# Patient Record
Sex: FEMALE | Race: WHITE
[De-identification: names, ages, dates, MRNs, and addresses within clinical notes are randomized per-mention and may not be internally consistent; named-entity substitution may affect disease eponyms.]

---

## 2017-03-29 ENCOUNTER — HOSPITAL ENCOUNTER (OUTPATIENT)
Dept: HOSPITAL 47 - RADXRMAIN | Age: 57
End: 2017-03-29
Payer: COMMERCIAL

## 2017-03-29 DIAGNOSIS — R10.2: Primary | ICD-10-CM

## 2017-03-29 PROCEDURE — 73502 X-RAY EXAM HIP UNI 2-3 VIEWS: CPT

## 2017-03-29 PROCEDURE — 72170 X-RAY EXAM OF PELVIS: CPT

## 2017-03-29 NOTE — XR
EXAMINATION TYPE: XR pelvis AP view

 

DATE OF EXAM: 3/29/2017 10:52 AM

 

CLINICAL HISTORY: pain

 

TECHNIQUE: Single view the pelvis is submitted.

 

FINDINGS: No  evidence for fracture, dislocation or bony lesion.  Joint spaces are well-preserved.  S
I joints appear symmetric.

 

IMPRESSION: 

 

1. No acute fracture or dislocation seen.

 

ICD 10 NO FRACTURE, INITIAL EVALUATION

## 2017-03-29 NOTE — XR
EXAMINATION TYPE: XR Hip Complete LT

 

DATE OF EXAM: 3/29/2017 10:52 AM

 

CLINICAL HISTORY: pain

 

TECHNIQUE:  AP and frogleg views of the left hip are obtained.

 

COMPARISON: None.

 

FINDINGS:  There is no acute fracture/dislocation evident.  The joint space  appears within normal li
mits.  The overlying soft tissue appears unremarkable.

 

IMPRESSION:  

1.  There is no acute fracture or dislocation.

 

ICD 10 NO FRACTURE, INITIAL EVALUATION

## 2017-04-27 ENCOUNTER — HOSPITAL ENCOUNTER (OUTPATIENT)
Dept: HOSPITAL 47 - RADXRMAIN | Age: 57
Discharge: HOME | End: 2017-04-27
Payer: COMMERCIAL

## 2017-04-27 ENCOUNTER — HOSPITAL ENCOUNTER (OUTPATIENT)
Dept: HOSPITAL 47 - RADUSWWP | Age: 57
Discharge: HOME | End: 2017-04-27
Payer: COMMERCIAL

## 2017-04-27 DIAGNOSIS — M79.669: Primary | ICD-10-CM

## 2017-04-27 DIAGNOSIS — R93.7: Primary | ICD-10-CM

## 2017-04-27 DIAGNOSIS — M79.669: ICD-10-CM

## 2017-04-27 NOTE — US
EXAMINATION TYPE: US venous doppler duplex LE LT

 

DATE OF EXAM: 4/27/2017 2:32 PM

 

COMPARISON: US

 

CLINICAL HISTORY: 56-year-old female M79.669 Pain in unspecified lower leg. Intermittent left thigh p
ain x 3 months

 

SIDE PERFORMED: Left  

 

TECHNIQUE:  The lower extremity deep venous system is examined utilizing real time linear array sonog
pascual with graded compression, doppler sonography and color-flow sonography.

 

FINDINGS:

 

VESSELS IMAGED:

External Iliac Vein (EIV)

Common Femoral Vein

Deep Femoral Vein

Greater Saphenous Vein *

Femoral Vein

Popliteal Vein

Small Saphenous Vein *

Proximal Calf Veins

(* superficial vessels)

 

 

 

Left Leg:  Appears negative for DVT

 

 

 

IMPRESSION:

No evidence for DVT within the left lower extremity imaged from the groin to the upper calf.

## 2017-04-27 NOTE — XR
EXAMINATION TYPE: XR femur LT

 

DATE OF EXAM: 4/27/2017 10:14 AM

 

COMPARISON: NONE

 

HISTORY: Pain

 

TECHNIQUE: 4 views

 

FINDINGS: There is slight lateral cortical thickening along the proximal diaphysis of the left femur.
 Remaining portion of the osseous structures are intact. Mild arthropathy of the hip joints. No acute
 fracture or dislocation.

 

IMPRESSION: 

1. Mild cortical thinning proximal diaphysis of the femur. Recommend correlation with bone scan if th
is is the area of clinical abnormality.

## 2017-05-05 ENCOUNTER — HOSPITAL ENCOUNTER (OUTPATIENT)
Dept: HOSPITAL 47 - RADNMMAIN | Age: 57
End: 2017-05-05
Payer: COMMERCIAL

## 2017-05-05 DIAGNOSIS — M89.9: Primary | ICD-10-CM

## 2017-05-05 PROCEDURE — 78300 BONE IMAGING LIMITED AREA: CPT

## 2017-05-05 NOTE — NM
EXAMINATION TYPE: NM bone/joint limited

 

DATE OF EXAM: 5/5/2017 1:58 PM

 

COMPARISON: NONE

 

HISTORY: Left femoral pain

 

TECHNIQUE:  After the intravenous administration of 25.8 mCi Tc 99m MDP.  Images acquired 3 hours pos
t injection.  Multiple views of both femurs are submitted. 

 

There is no abnormal uptake within the visualized osseous structures to suggest acute process. 

 

IMPRESSION:

 

 NO ACUTE OSSEOUS ABNORMALITY.

## 2022-02-25 ENCOUNTER — HOSPITAL ENCOUNTER (EMERGENCY)
Dept: HOSPITAL 47 - EC | Age: 62
Discharge: HOME | End: 2022-02-25
Payer: COMMERCIAL

## 2022-02-25 VITALS — RESPIRATION RATE: 18 BRPM | TEMPERATURE: 97.7 F

## 2022-02-25 VITALS — SYSTOLIC BLOOD PRESSURE: 145 MMHG | HEART RATE: 78 BPM | DIASTOLIC BLOOD PRESSURE: 64 MMHG

## 2022-02-25 DIAGNOSIS — K21.9: ICD-10-CM

## 2022-02-25 DIAGNOSIS — I10: ICD-10-CM

## 2022-02-25 DIAGNOSIS — Z88.2: ICD-10-CM

## 2022-02-25 DIAGNOSIS — M17.11: Primary | ICD-10-CM

## 2022-02-25 DIAGNOSIS — Z90.710: ICD-10-CM

## 2022-02-25 DIAGNOSIS — Z87.440: ICD-10-CM

## 2022-02-25 DIAGNOSIS — Z90.49: ICD-10-CM

## 2022-02-25 DIAGNOSIS — J45.909: ICD-10-CM

## 2022-02-25 DIAGNOSIS — E78.5: ICD-10-CM

## 2022-02-25 PROCEDURE — 96372 THER/PROPH/DIAG INJ SC/IM: CPT

## 2022-02-25 PROCEDURE — 99283 EMERGENCY DEPT VISIT LOW MDM: CPT

## 2022-02-25 PROCEDURE — 73562 X-RAY EXAM OF KNEE 3: CPT

## 2022-02-25 NOTE — XR
EXAMINATION TYPE: XR knee complete RT

 

DATE OF EXAM: 2/25/2022

 

COMPARISON: 4/27/2017

 

HISTORY: 61 years Female.

STUDY INDICATION GIVEN: pain . 

 

TECHNIQUE: AP, oblique and lateral radiographs of the right knee

 

IMPRESSION: 

 

Mild-to-moderate tricompartmental osteoarthrosis. Trace joint effusion no acute fracture or dislocati
on.

## 2022-02-25 NOTE — ED
General Adult HPI





- General


Chief complaint: Extremity Injury, Lower


Stated complaint: Knee pain


Time Seen by Provider: 22 16:02


Source: patient, RN notes reviewed, old records reviewed


Mode of arrival: wheelchair


Limitations: no limitations





- History of Present Illness


Initial comments: 





61-year-old female presents to the emergency room with complaints of right knee 

pain.  [facility] and had imaging done and diagnosed with

osteoarthritis.  Patient states that she continues to have pain worse with 

ambulation.  She does have an appointment for a second opinion on Monday. Denies

any calf pain, fevers or injury.


-: week(s)


Location: right, lower extremity (Knee)


Severity scale (1-10): 10


Quality: constant


Consistency: constant


Improves with: immobilization


Worsens with: other (Weightbearing)


Associated Symptoms: denies other symptoms





- Related Data


                                Home Medications











 Medication  Instructions  Recorded  Confirmed


 


Atorvastatin Calcium 10 mg PO HS 14


 


Esomeprazole Magnesium [NexIUM] 40 mg PO QAM 14


 


Montelukast Sodium 10 mg PO HS 14


 


Hydrocodone/Chlorphen P-Stirex 5 ml PO HS 14





[Tussionex]   


 


Fluticasone/Vilanterol [Breo 1 puff INHALATION RT-DAILY 16





Ellipta 100-25 Mcg Inhaler]   


 


Calcium Carbonate [Calcium] 1,200 mg PO DAILY 16


 


Ranitidine HCl [Zantac] 150 mg PO BID 16








                                  Previous Rx's











 Medication  Instructions  Recorded


 


Azithromycin [Zithromax] 500 mg PO DAILY #5 tab 16


 


Benzonatate [Tessalon Perles] 200 mg PO TID #42 capsule 16


 


Budesonide [Pulmicort] 0.5 mg IH BID #60 ampul.neb 16


 


amLODIPine [Norvasc] 5 mg PO DAILY #30 tab 16


 


predniSONE 20 mg PO AS DIRECTED #60 tab 16


 


Meloxicam [Mobic] 7.5 mg PO DAILY 30 Days #30 tab 22











                                    Allergies











Allergy/AdvReac Type Severity Reaction Status Date / Time


 


amoxicillin AdvReac  SORE THROAT Verified 16 12:17


 


iodine AdvReac  Rash/Hives Verified 16 12:17


 


Sulfa (Sulfonamide AdvReac  Rash/Hives Verified 16 12:17





Antibiotics)     














Review of Systems


ROS Statement: 


Those systems with pertinent positive or pertinent negative responses have been 

documented in the HPI.





ROS Other: All systems not noted in ROS Statement are negative.





Past Medical History


Past Medical History: Asthma, GERD/Reflux, Hyperlipidemia, Hypertension, 

Pneumonia


Additional Past Medical History / Comment(s): , bronchitis's, bronchiectasis, 

tracheobroncomalacia, pseudomonas bilateral lungs, sinus problems, hiatal 

hernia, chronic back pain, UTI, enlarged heart, dislocated R shoulder, 

scoliosis, numerous fractures-bilateral ankles x 2, bilateral femurs, bilateral 

feet-had bone bx to find out why so many fx's and found to be due to medication.


History of Any Multi-Drug Resistant Organisms: None Reported


Past Surgical History: Appendectomy, Cholecystectomy, Hysterectomy, Joint 

Replacement, Orthopedic Surgery, Tonsillectomy


Additional Past Surgical History / Comment(s): BRONCHOSCOPY X 4 with last one 

being done 3/15/16, RT elbow SURGERY X 7 with eventual RT ELBOW REPLACEMENT, LT 

elbow SURGERY for tendonitis, L foot BUNIONECTOMY x2 with plate and screws,.  

COLONOSCOPY


Past Anesthesia/Blood Transfusion Reactions: Previous Problems w/ Anesthesia, 

Postoperative Nausea & Vomiting (PONV)


Additional Past Anesthesia/Blood Transfusion Reaction / Comment(s): SLOW TO WAKE

 UP


Past Psychological History: No Psychological Hx Reported


Past Alcohol Use History: None Reported


Past Drug Use History: None Reported





- Past Family History


  ** Mother


Family Medical History: Deep Vein Thrombosis (DVT), Hypertension


Additional Family Medical History / Comment(s): Mother  of DVT at age 70 

yrs.





  ** Father


History Unknown: Yes





General Exam


Limitations: no limitations


General appearance: alert, in no apparent distress


Head exam: Present: atraumatic, normocephalic, normal inspection





Course


                                   Vital Signs











  22





  15:58 18:45


 


Temperature 97.7 F 


 


Pulse Rate 77 78


 


Respiratory 18 18





Rate  


 


Blood Pressure 152/85 145/64


 


O2 Sat by Pulse 98 99





Oximetry  














Medical Decision Making





- Medical Decision Making


Patient presents with right knee pain ongoing for several weeks worsening today 

stating unable to bear weight.  She denies any other joint pains. She [facility] 

[facility] and told that this is osteoarthritis. 

She did bring in a copy of her xr report from .  She denies any trauma

 or calf pain.  There is no evidence of erythema, signs of infection or concern 

for septic joint.


Patient states she does have an appointment with an orthopedic doctor on Monday 

for second opinion. 


X-ray of the right knee shows mild to moderate tricompartmental osteoarthrosis. 

 There is trace joint effusion, no fracture or dislocation. 


Patient was prescribed meloxicam, states she has used it in the past with 

relief.  She was given a take-home bottle Tylenol 3 for additional pain control 

directed to return to the emergency room with any worsening pain or inability to

 ambulate.  Case discussed with Dr. Bell.





Disposition


Clinical Impression: 


 Knee pain, right





Disposition: HOME SELF-CARE


Condition: Good


Instructions (If sedation given, give patient instructions):  Knee Pain (ED)


Additional Instructions: 


Take the meloxicam as prescribed once a day for inflammation.  Rest, ice and 

elevate right knee to help with inflammation and pain.  Keep your appointment 

with the orthopedic doctor as scheduled on Monday.  Use your walker or 

wheelchair as needed.


Prescriptions: 


Meloxicam [Mobic] 7.5 mg PO DAILY 30 Days #30 tab


Is patient prescribed a controlled substance at d/c from ED?: No


Referrals: 


Ivan Flores MD [Primary Care Provider] - 1-2 days


Time of Disposition: 18:21

## 2023-06-20 ENCOUNTER — HOSPITAL ENCOUNTER (OUTPATIENT)
Dept: HOSPITAL 47 - LABWHC1 | Age: 63
Discharge: HOME | End: 2023-06-20
Attending: OTOLARYNGOLOGY
Payer: MEDICARE

## 2023-06-20 ENCOUNTER — HOSPITAL ENCOUNTER (OUTPATIENT)
Dept: HOSPITAL 47 - RADCTMAIN | Age: 63
Discharge: HOME | End: 2023-06-20
Attending: OTOLARYNGOLOGY
Payer: MEDICARE

## 2023-06-20 DIAGNOSIS — J32.9: Primary | ICD-10-CM

## 2023-06-20 DIAGNOSIS — E03.9: Primary | ICD-10-CM

## 2023-06-20 PROCEDURE — 80069 RENAL FUNCTION PANEL: CPT

## 2023-06-20 PROCEDURE — 84443 ASSAY THYROID STIM HORMONE: CPT

## 2023-06-20 PROCEDURE — 70486 CT MAXILLOFACIAL W/O DYE: CPT

## 2023-06-20 PROCEDURE — 82607 VITAMIN B-12: CPT

## 2023-06-20 PROCEDURE — 80048 BASIC METABOLIC PNL TOTAL CA: CPT

## 2023-06-20 PROCEDURE — 36415 COLL VENOUS BLD VENIPUNCTURE: CPT

## 2023-06-20 NOTE — CT
EXAMINATION TYPE: CT sinus wo con

CT DLP: 528 mGycm, Automated exposure control for dose reduction was used.

 

DATE OF EXAM: 6/20/2023 2:01 PM

 

COMPARISON:  9/29/2014  .  

 

CLINICAL INDICATION:Female, 62 years old with history of [id number]; , chronic sinusitis

 

TECHNIQUE: Multiple thin axial images were obtained through the paranasal sinuses without the use of 
IV contrast. Additional coronal and sagittal reformatted images were submitted for evaluation. 

Contrast used: none

Oral contrast used: none

 

FINDINGS: 

 

Frontal sinuses: Normally developed and aerated. Frontal Recess: Clear

Maxillary Sinuses: Normally developed and aerated.

Maxillary Infundibula([facility]): Clear, No Paul cells identified.  

Ethmoid sinuses: Normally developed and aerated. Ethmoidal notch: Protected and abutting the lateral 
lamina.

Sphenoid sinuses: Normally developed with mild mucosal thickening in the right sphenoid sinus. There 
is sellar sphenoid sinus pneumatization without evidence of dehiscence.  No dehiscence of carotid can
al. No evidence of optic nerve dehiscence within the sphenoid sinus. No evidence of Onodi cells.  Sph
enoethmoidal recesses: Clear.

 

Nasal septum: Within normal limits..

Nasal Turbinates: Within normal limits. .

Mastoid air cells & middle ears: The air cells are clear. The middle ears are grossly unremarkable.

Modified Soft tissues & Brain: Partially seen without gross abnormality. Globes are intact.

 

Other: 

Cribriform plate demonstrates symmetric  Keros classification type 2 cribriform plate. No evidence of
 bony dehiscence of skull base.

 

Lamina papyracea is intact without evidence of remote orbital fracture or orbital prolapse into the e
thmoid sinus. 

 

IMPRESSION:

1. No significant mucosal sinus disease. No significant change from prior.

 

2. The ostiomeatal units, frontonasal and sphenoethmoidal recesses are clear.

## 2023-06-21 LAB
ALBUMIN SERPL-MCNC: 4.3 D/DL (ref 3.8–4.9)
ANION GAP SERPL CALC-SCNC: 13.5 MMOL/L (ref 4–12)
BUN SERPL-SCNC: 13.9 MG/DL (ref 9–27)
BUN/CREAT SERPL: 15.44 RATIO (ref 12–20)
CALCIUM SPEC-MCNC: 9.4 MG/DL (ref 8.7–10.3)
CHLORIDE SERPL-SCNC: 106 MMOL/L (ref 96–109)
CO2 SERPL-SCNC: 22.5 MMOL/L (ref 21.6–31.8)
GLUCOSE SERPL-MCNC: 114 MG/DL (ref 70–110)
POTASSIUM SERPL-SCNC: 4 MMOL/L (ref 3.5–5.5)
SODIUM SERPL-SCNC: 142 MMOL/L (ref 135–145)
VIT B12 SERPL-MCNC: 435 PG/ML (ref 200–944)

## 2023-10-12 ENCOUNTER — HOSPITAL ENCOUNTER (OUTPATIENT)
Dept: HOSPITAL 47 - RADBDWWP | Age: 63
Discharge: HOME | End: 2023-10-12
Attending: INTERNAL MEDICINE
Payer: MEDICARE

## 2023-10-12 DIAGNOSIS — Z13.820: Primary | ICD-10-CM

## 2023-10-12 DIAGNOSIS — Z78.0: ICD-10-CM

## 2023-10-12 DIAGNOSIS — M85.88: ICD-10-CM

## 2023-10-12 PROCEDURE — 77080 DXA BONE DENSITY AXIAL: CPT

## 2023-10-12 NOTE — BD
EXAMINATION TYPE: Axial Bone Density

 

DATE OF EXAM: 10/12/2023

 

CLINICAL HISTORY: 63 years old Female.  ICD-10 CODE: Z78.0 asymptomatic menopausal state

 

Height:  60.5

Weight:  277

 

FRAX RISK QUESTIONS:

Family History (Parent hip fracture):  no

History of Fracture in Adulthood: yes wrists, femur, shoulder, lt foot, bilat ankles 

Secondary Osteoporosis: no

Rheumatoid Arthritis: no

 

 

RISK FACTORS 

HISTORY OF: 

History of Wrist Fracture: yes

When: unsure  more than 20 + years

Surgery to Hip(right/left): yes, bilat

When: 2017

Family History of Osteoporosis: yes, mother

Active: semi-active

Diet low in dairy products/other sources of calcium:  no

Postmenopausal woman: yes

Lost more than 2 inches in height since high school: no

Frequent falls: no

Poor Health: no

 

 

 

MEDICATIONS: 

Additional Medications: yes

blood thinner, hbp meds 

 

 

 

EXAM MEASUREMENTS: 

Bone mineral densitometry was performed using the [employer] System.

Bone mineral density as measured about the Lumbar spine is:  

----- L1-L4(G/cm2): 0.918

T Score Values are as follows:

----- L1: -2.0

----- L2: -2.4

----- L3: -2.3

----- L4: -2.2

----- L1-L4: -2.2

 

Z Score Values are as follows:

----- L1: -1.5

----- L2: -2.0

----- L3: -1.9

----- L4: -1.7

----- L1-L4: -1.8

 

Bone mineral density has: Decreased -7.9% since study of: 05/07/2014

 

 

 

FRAX%s: not done... bilat hip replacements

 

 

 

 

IMPRESSION:

Osteopenia (T Score between -2.5 and -1).

 

There is slightly increased risk of fracture and the patient may be considered 

for treatment. 

 

Re-Screen 2-5 years.

 

NOTE:  T-SCORE=SD OF THE YOUNG ADULT MEAN. Patient called stating his BP is 87/41, HR is 118 and glucose is 235, states that he feels ok other than being weak. Patient advised to go to ER, verbalized understanding.